# Patient Record
Sex: FEMALE | Race: WHITE | URBAN - NONMETROPOLITAN AREA
[De-identification: names, ages, dates, MRNs, and addresses within clinical notes are randomized per-mention and may not be internally consistent; named-entity substitution may affect disease eponyms.]

---

## 2019-05-06 ENCOUNTER — IMPORTED ENCOUNTER (OUTPATIENT)
Dept: URBAN - NONMETROPOLITAN AREA CLINIC 1 | Facility: CLINIC | Age: 69
End: 2019-05-06

## 2019-05-06 PROBLEM — H52.223: Noted: 2019-05-06

## 2019-05-06 PROBLEM — H25.13: Noted: 2019-05-06

## 2019-05-06 PROBLEM — H52.13: Noted: 2019-05-06

## 2019-05-06 PROBLEM — H52.4: Noted: 2019-05-06

## 2019-05-06 PROCEDURE — 92015 DETERMINE REFRACTIVE STATE: CPT

## 2019-05-06 PROCEDURE — 92004 COMPRE OPH EXAM NEW PT 1/>: CPT

## 2019-05-06 NOTE — PATIENT DISCUSSION
Cataract OU-Not yet surgical. -Reviewed symptoms of advancing cataract growth such as glare and halos and decreased vision.-Continue to monitor for now. Pt will notify us if any new symptoms develop. Myopia-Discussed diagnosis with patient. -Explained that people who are myopic are at a higher risk for developing RD/RT and reviewed associated S&S.-Pt to contact our office if symptoms develop. Astigmatism-Discussed diagnosis with patient. Updated spec Rx given. Recommend lens that will provide comfort as well as protect safety and health of eyes.

## 2022-04-09 ASSESSMENT — VISUAL ACUITY
OS_CC: J1
OD_SC: 20/40
OD_PH: 20/25
OS_SC: 20/30
OS_PH: 20/25
OD_CC: J1

## 2022-04-09 ASSESSMENT — TONOMETRY
OS_IOP_MMHG: 18
OD_IOP_MMHG: 17